# Patient Record
Sex: MALE | Race: WHITE | Employment: FULL TIME | URBAN - METROPOLITAN AREA
[De-identification: names, ages, dates, MRNs, and addresses within clinical notes are randomized per-mention and may not be internally consistent; named-entity substitution may affect disease eponyms.]

---

## 2024-08-10 ENCOUNTER — HOSPITAL ENCOUNTER (EMERGENCY)
Age: 24
Discharge: HOME | End: 2024-08-11
Attending: EMERGENCY MEDICINE
Payer: COMMERCIAL

## 2024-08-10 VITALS
SYSTOLIC BLOOD PRESSURE: 139 MMHG | TEMPERATURE: 98.8 F | DIASTOLIC BLOOD PRESSURE: 87 MMHG | OXYGEN SATURATION: 100 % | HEART RATE: 80 BPM | RESPIRATION RATE: 22 BRPM

## 2024-08-10 DIAGNOSIS — H81.399 PERIPHERAL VERTIGO, UNSPECIFIED LATERALITY: Primary | ICD-10-CM

## 2024-08-10 PROCEDURE — 99283 EMERGENCY DEPT VISIT LOW MDM: CPT

## 2024-08-10 PROCEDURE — 6370000000 HC RX 637 (ALT 250 FOR IP)

## 2024-08-10 PROCEDURE — 93005 ELECTROCARDIOGRAM TRACING: CPT | Performed by: EMERGENCY MEDICINE

## 2024-08-10 RX ORDER — MECLIZINE HCL 12.5 MG/1
25 TABLET ORAL ONCE
Status: COMPLETED | OUTPATIENT
Start: 2024-08-11 | End: 2024-08-10

## 2024-08-10 RX ADMIN — MECLIZINE HCL 12.5 MG 25 MG: 12.5 TABLET ORAL at 23:55

## 2024-08-10 ASSESSMENT — PAIN - FUNCTIONAL ASSESSMENT: PAIN_FUNCTIONAL_ASSESSMENT: NONE - DENIES PAIN

## 2024-08-11 RX ORDER — MECLIZINE HCL 12.5 MG/1
25 TABLET ORAL 3 TIMES DAILY PRN
Qty: 15 TABLET | Refills: 0 | Status: SHIPPED | OUTPATIENT
Start: 2024-08-11 | End: 2024-08-11

## 2024-08-11 RX ORDER — MECLIZINE HYDROCHLORIDE 25 MG/1
25 TABLET ORAL 3 TIMES DAILY PRN
Qty: 10 TABLET | Refills: 0 | Status: SHIPPED | OUTPATIENT
Start: 2024-08-11

## 2024-08-11 NOTE — DISCHARGE INSTRUCTIONS
You came in with a spinning sensation.  According to examination, assessment there are no concerns for stroke or brain pathology.  It is going more with peripheral vertigo.    He has been given occupational health forms, follow-up with occupational health on next working day which is Monday.  In future, avoid exposure to smoke.    Take medication as prescribed.  Avoid driving or going to heights alone as medicine prescribed can cause drowsiness and mild imbalance.  When you have active symptoms of spinning sensation avoid driving, as it can cause damage.    If you develop severe headache, change in vision, focal weakness, persistent nausea, vomiting, inability to walk straight, come to emergency department.

## 2024-08-11 NOTE — ED PROVIDER NOTES
Note Started: 12:19 AM EDT         [facility]     Emergency Department     Faculty Attestation    I performed a history and physical examination of the patient and discussed management with the resident. I reviewed the resident’s note and agree with the documented findings and plan of care. Any areas of disagreement are noted on the chart. I was personally present for the key portions of any procedures. I have documented in the chart those procedures where I was not present during the key portions. I have reviewed the emergency nurses triage note. I agree with the chief complaint, past medical history, past surgical history, allergies, medications, social and family history as documented unless otherwise noted below.        For Physician Assistant/ Nurse Practitioner cases/documentation I have personally evaluated this patient and have completed at least one if not all key elements of the E/M (history, physical exam, and MDM). Additional findings are as noted.  I have personally seen and evaluated the patient.  I find the patient's history and physical exam are consistent with the NP/PA documentation.  I agree with the care provided, treatment rendered, disposition and follow-up plan.    24-year-old male presenting with nausea and vertigo.  Room spinning sensation started today.  Patient works at the [employer], was exposed to an unknown substance at an inmate with smoking.  Shortly after started feeling vertiginous and nauseous.  He has vomited once.    Exam:  General : Laying on the bed, awake, alert, and in no acute distress  CV : normal rate and regular rhythm  Lungs : Breathing comfortably on room air with no tachypnea, hypoxia, or increased work of breathing  Neuro: Awake, alert, answering questions appropriately.  Pupils equal and reactive to light.  EOMs intact, lateral nystagmus is present, more with leftward gaze.  No skew.    DDx: BPPV.  No viral symptoms or ear pain/hearing loss to 
discharge home.  Occupational health follow-up instructions given. [TS]      ED Course User Index  [TS] Ramez Dyson MD       PROCEDURES:  None    CONSULTS:  None    CRITICAL CARE:  There was significant risk of life threatening deterioration of patient's condition requiring my direct management. Critical care time 0 minutes, excluding any documented procedures.    FINAL IMPRESSION      1. Peripheral vertigo, unspecified laterality          DISPOSITION / PLAN     DISPOSITION Decision To Discharge 08/11/2024 12:35:21 AM      PATIENT REFERRED TO:  Your PCP      Call to make follow-up    [facility]  [address] [address] [phone]  Go to   If symptoms worsen    [facility]  [address] [place] [id number] [phone]  Call   Call to make follow-up      DISCHARGE MEDICATIONS:  Current Discharge Medication List        START taking these medications    Details   meclizine (ANTIVERT) 25 MG tablet Take 1 tablet by mouth 3 times daily as needed for Dizziness or Nausea  Qty: 10 tablet, Refills: 0             Ramez Dyson MD  Emergency Medicine Resident    (Please note that portions of this note were completed with a voice recognition program.  Efforts were made to edit the dictations but occasionally words are mis-transcribed.)

## 2024-08-11 NOTE — ED NOTES
Pt presents to the ED with c/o dizziness and palpitations that onset earlier today.   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 Pt states it did not smell like tobacco.   Since then, pt has been feeling dizzy without LOC. Pt also reports he will occasionally feel hot and feel like his heart is racing.   Pt is in no distress, RR even and unlabored. Pt answering questions appropriately and walked under his own power from triage.   Pt placed on cardiac monitor, EKG done, IV established, labs drawn. Urine sample collected.   Call light within reach.

## 2024-08-13 LAB
EKG ATRIAL RATE: 71 BPM
EKG P AXIS: 54 DEGREES
EKG P-R INTERVAL: 154 MS
EKG Q-T INTERVAL: 368 MS
EKG QRS DURATION: 94 MS
EKG QTC CALCULATION (BAZETT): 399 MS
EKG R AXIS: 90 DEGREES
EKG T AXIS: 65 DEGREES
EKG VENTRICULAR RATE: 71 BPM